# Patient Record
Sex: MALE | Race: WHITE | NOT HISPANIC OR LATINO | Employment: FULL TIME | URBAN - METROPOLITAN AREA
[De-identification: names, ages, dates, MRNs, and addresses within clinical notes are randomized per-mention and may not be internally consistent; named-entity substitution may affect disease eponyms.]

---

## 2019-12-20 ENCOUNTER — OFFICE VISIT (OUTPATIENT)
Dept: FAMILY MEDICINE CLINIC | Facility: CLINIC | Age: 29
End: 2019-12-20

## 2019-12-20 ENCOUNTER — TELEPHONE (OUTPATIENT)
Dept: FAMILY MEDICINE CLINIC | Facility: CLINIC | Age: 29
End: 2019-12-20

## 2019-12-20 ENCOUNTER — HOSPITAL ENCOUNTER (OUTPATIENT)
Dept: GENERAL RADIOLOGY | Facility: HOSPITAL | Age: 29
Discharge: HOME | End: 2019-12-20
Admitting: FAMILY MEDICINE

## 2019-12-20 VITALS
WEIGHT: 179 LBS | HEIGHT: 74 IN | RESPIRATION RATE: 18 BRPM | BODY MASS INDEX: 22.97 KG/M2 | DIASTOLIC BLOOD PRESSURE: 82 MMHG | TEMPERATURE: 97.8 F | HEART RATE: 78 BPM | SYSTOLIC BLOOD PRESSURE: 136 MMHG

## 2019-12-20 DIAGNOSIS — S29.9XXA RIB INJURY: Primary | ICD-10-CM

## 2019-12-20 PROCEDURE — 71100 X-RAY EXAM RIBS UNI 2 VIEWS: CPT

## 2019-12-20 PROCEDURE — 99203 OFFICE O/P NEW LOW 30 MIN: CPT | Performed by: FAMILY MEDICINE

## 2019-12-20 NOTE — PROGRESS NOTES
Assessment/Plan       Problems Addressed this Visit     None      Visit Diagnoses     Rib injury    -  Primary    Relevant Orders    XR Ribs 2 View Right (Completed)            Follow up: No follow-ups on file.     DISCUSSION  New patient here for evaluation.  Recent injury to the right chest wall while playing basketball.  Given chronicity of the discomfort over the last 3 to 4 weeks, recommend check x-ray to evaluate for possible rib fracture.  If negative, then may just be inflammation and need to give a little more time and rest.  Further plan once x-ray back.    Recommend follow-up in 2020 for well examination and routine blood work.             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 Blood clot.  3 months ago    Has had depressed mood since father     1 child due in May (girl)     for college  From [employer]     Had labs done 2 months ago  Labs were all ok    + FH Crohn's   + DM FH        Social History     Tobacco Use   Smoking Status Never Smoker          Past Medical History,Medications, Allergies, and social history was reviewed.          Review of Systems  "  Constitutional: Negative.    HENT: Negative.    Respiratory: Negative.    Cardiovascular: Positive for chest pain ( Right lateral chest wall pain).   Gastrointestinal: Negative.    Genitourinary: Negative.    Musculoskeletal: Negative.    Neurological: Negative.    Psychiatric/Behavioral: Negative.  Positive for depressed mood ( Related to father's recent death).       Objective     Vitals:    12/20/19 1444 12/20/19 1530   BP: 150/74 136/82   Pulse: 78    Resp: 18    Temp: 97.8 °F (36.6 °C)    Weight: 81.2 kg (179 lb)    Height: 188 cm (74\")           Physical Exam   Constitutional: Vital signs are normal. He appears well-developed and well-nourished.   HENT:   Head: Normocephalic and atraumatic.   Right Ear: Hearing, tympanic membrane, external ear and ear canal normal.   Left Ear: Hearing, tympanic membrane, external ear and ear canal normal.   Mouth/Throat: Oropharynx is clear and moist.   Eyes: Pupils are equal, round, and reactive to light. Conjunctivae, EOM and lids are normal.   Neck: Normal range of motion. Neck supple. No thyromegaly present.   Cardiovascular: Normal rate, regular rhythm and normal heart sounds. Exam reveals no friction rub.   No murmur heard.  Pulmonary/Chest: Effort normal and breath sounds normal. No respiratory distress. He has no wheezes. He has no rales.   Abdominal: Soft. Normal appearance and bowel sounds are normal. He exhibits no distension and no mass. There is no tenderness. There is no rebound and no guarding.   Musculoskeletal: He exhibits no edema.   Tenderness of the right lateral chest wall over the ribs.  Lower rib area.  No significant bruising seen at this time.  No crepitus.   Neurological: He is alert. He has normal strength.   Skin: Skin is warm and dry.   Psychiatric: He has a normal mood and affect. His speech is normal. Cognition and memory are normal.   Nursing note and vitals reviewed.    X-ray was ordered and result reviewed.  No obvious fracture.        "       Stanley Lowe MD

## 2023-01-01 NOTE — TELEPHONE ENCOUNTER
Please call.  We fortunately have already received the results from the radiologist.  There is no rib fracture seen on the x-ray.  Good news.  Resume normal activities as tolerated but it may take another couple weeks to completely get better.   Parent

## 2023-03-01 ENCOUNTER — OFFICE VISIT (OUTPATIENT)
Dept: FAMILY MEDICINE CLINIC | Facility: CLINIC | Age: 33
End: 2023-03-01
Payer: COMMERCIAL

## 2023-03-01 VITALS
TEMPERATURE: 100 F | HEIGHT: 74 IN | HEART RATE: 90 BPM | WEIGHT: 172 LBS | OXYGEN SATURATION: 96 % | RESPIRATION RATE: 18 BRPM | DIASTOLIC BLOOD PRESSURE: 88 MMHG | SYSTOLIC BLOOD PRESSURE: 134 MMHG | BODY MASS INDEX: 22.07 KG/M2

## 2023-03-01 DIAGNOSIS — S29.011A MUSCLE STRAIN OF CHEST WALL, INITIAL ENCOUNTER: ICD-10-CM

## 2023-03-01 DIAGNOSIS — Z00.00 WELLNESS EXAMINATION: Primary | ICD-10-CM

## 2023-03-01 DIAGNOSIS — F41.1 GENERALIZED ANXIETY DISORDER: ICD-10-CM

## 2023-03-01 PROCEDURE — 99385 PREV VISIT NEW AGE 18-39: CPT

## 2023-03-01 RX ORDER — ESCITALOPRAM OXALATE 10 MG/1
10 TABLET ORAL DAILY
Qty: 90 TABLET | Refills: 0 | Status: SHIPPED | OUTPATIENT
Start: 2023-03-01

## 2023-03-01 NOTE — PATIENT INSTRUCTIONS
Recommend updating tdap vaccine at pharmacy at patient convenience  Medication as prescribed - notify of any adverse reaction or side effects  Follow up in 4-6 weeks, sooner if needed

## 2023-03-01 NOTE — PROGRESS NOTES
Chief Complaint   Patient presents with   • Establish Care     Re establish - wants to be proactive with health.  Upper left abdominal pressure- discomfort  less than 2 months feels swollen on the inside.  Mother has crohns disease , he has concerns,  Need for Bathroom  is really quick for bowel movements when he has to go.       Subjective      Jaun Neri is a 33 y.o. who presents to re-establish care, have a physical and discuss abdominal pain.  He works out 4-5 days weekly.  Works a stressful job - an . Commutes to [place], [place].  Has two kids at home ages 2.5 and 6 months.     Left upper quadrant abdominal pain - started as discomfort to left upper quadrant 6-7 weeks ago.  Since that time, it has improved.  No trauma. He does lift on his daughter and works out regularly. Voices concern that he could have an issue with his pancreas because the pain lasted for awhile.  It is not painful today.     GI Upset - Family history of Crohn's.  States he has always had a sensitive stomach.  Had appendectomy about 8 years ago.  States he is sensitive to dairy; cramping and then has diarrhea.  If he avoids dairy and other dietary triggers, he is able to avoid GI upset for the most part.  Since appendectomy he has also had some difficulty tolerating abdominal workouts - has muscular cramping.     Anxiety - Had panic attack when his father passed away three years ago.  States that he has never been medicated for anxiety. He is interested in medication.  Admits he does feel down sometimes.  Does not have any difficulty with sleep. Denies SI/HI. Feels that he has always dealt with anxiety, but feels it is time to discuss medication.     The following portions of the patient's history were reviewed and updated as appropriate: allergies, current medications, past family history, past medical history, past social history, past surgical history and problem list.    Review of Systems   Constitutional: Negative for  "activity change, appetite change, chills, diaphoresis and fatigue.   HENT: Negative.  Negative for congestion, ear discharge, ear pain, facial swelling, rhinorrhea, sore throat and tinnitus.    Eyes: Negative.    Respiratory: Negative for chest tightness and shortness of breath.    Cardiovascular: Negative for chest pain, palpitations and leg swelling.   Gastrointestinal: Positive for indigestion (with certain foods - spicy). Negative for abdominal pain, anal bleeding, blood in stool, constipation, diarrhea, nausea, vomiting and GERD.   Endocrine: Negative.    Genitourinary: Negative for decreased libido, dysuria, frequency, hematuria, nocturia (drinks a lot of water), penile swelling and testicular pain.   Musculoskeletal: Negative.    Neurological: Negative for dizziness, light-headedness and numbness.   Psychiatric/Behavioral: Positive for stress (has stressful job). Negative for decreased concentration, dysphoric mood, self-injury, sleep disturbance, suicidal ideas and depressed mood. The patient is nervous/anxious.        Objective   Vital Signs:  /88   Pulse 90   Temp 100 °F (37.8 °C)   Resp 18   Ht 188 cm (74\")   Wt 78 kg (172 lb)   SpO2 96%   BMI 22.08 kg/m²     BMI is within normal parameters. No other follow-up for BMI required.        Physical Exam  Vitals and nursing note reviewed.   Constitutional:       General: He is not in acute distress.     Appearance: Normal appearance. He is not ill-appearing.   HENT:      Head: Normocephalic and atraumatic.      Right Ear: Tympanic membrane, ear canal and external ear normal.      Left Ear: Tympanic membrane, ear canal and external ear normal.      Nose: Nose normal.      Mouth/Throat:      Mouth: Mucous membranes are moist.      Pharynx: No oropharyngeal exudate or posterior oropharyngeal erythema.   Eyes:      General:         Right eye: No discharge.         Left eye: No discharge.      Extraocular Movements: Extraocular movements intact.      " Pupils: Pupils are equal, round, and reactive to light.   Cardiovascular:      Rate and Rhythm: Normal rate and regular rhythm.      Pulses: Normal pulses.      Heart sounds: Normal heart sounds.   Pulmonary:      Effort: Pulmonary effort is normal. No respiratory distress.      Breath sounds: Normal breath sounds.   Chest:      Chest wall: No swelling, tenderness or crepitus.   Abdominal:      General: Bowel sounds are normal. There is no distension.      Palpations: Abdomen is soft. There is no mass.      Tenderness: There is no abdominal tenderness. There is no guarding.   Musculoskeletal:      Cervical back: No tenderness.   Lymphadenopathy:      Cervical: No cervical adenopathy.      Upper Body:      Right upper body: No supraclavicular adenopathy.      Left upper body: No supraclavicular adenopathy.   Neurological:      General: No focal deficit present.      Mental Status: He is alert and oriented to person, place, and time.      Cranial Nerves: No cranial nerve deficit.   Psychiatric:         Attention and Perception: Attention normal.         Mood and Affect: Affect normal. Mood is anxious. Mood is not depressed.         Speech: Speech normal.         Behavior: Behavior normal.         Thought Content: Thought content normal.          Result Review                  RENO-7 = 11       Assessment and Plan  Diagnoses and all orders for this visit:    1. Wellness examination (Primary)  -     CBC Auto Differential  -     Comprehensive Metabolic Panel  -     Hepatitis C Antibody  -     Lipid Panel    2. Generalized anxiety disorder  -     escitalopram (Lexapro) 10 MG tablet; Take 1 tablet by mouth Daily.  Dispense: 90 tablet; Refill: 0  -     Ambulatory Referral to Behavioral Health    3. Muscle strain of chest wall, initial encounter  Comments:  Resolving    Other orders  -     CBC & Differential            Patient Instructions   Recommend updating tdap vaccine at pharmacy at patient convenience  Medication as  prescribed - notify of any adverse reaction or side effects  Follow up in 4-6 weeks, sooner if needed     Discussed wellness and preventative medicine with patient.  He is going to update his tdap vaccine at the pharmacy.  We will get a lipid panel today and check non-fasting labs.  Discussed Hep C screening and patient is agreeable to having this checked as well.      Discussed medication prescribed, possible side effects and safety.  Encouraged patient to call or follow up with any side effects.  He has never had any issue with SI/HI, but did warn that sometimes medication can cause these thoughts.  Patient verbalized understanding of all.     Patient is also interested in going to therapy for his anxiety - referral generated.     Discussed possibility of abdominal / chest wall strain with patient.  Since his symptoms have almost resolved at this point, it is likely that he had a chest wall strain either from working out or lifting and it just took some time to resolve.  Explained common signs and symptoms of pancreatitis for patient to watch for, but I do not suspect any issues with his pancreas at this time.  We also discussed his GI upset and the possibility that his high level of anxiety could be a contributing factor.  Patient to continue to avoid dietary triggers, and we will see if treating his anxiety will help with those symptoms.      Patient was given opportunity to ask questions and all concerns were addressed prior to the conclusion of today's visit.     Follow Up  Return in about 5 weeks (around 4/5/2023).  Patient was given instructions and counseling regarding his condition or for health maintenance advice. Please see specific information pulled into the AVS if appropriate.

## 2023-03-02 PROBLEM — F41.1 GENERALIZED ANXIETY DISORDER: Status: ACTIVE | Noted: 2023-03-02

## 2023-03-02 LAB
ALBUMIN SERPL-MCNC: 5.2 G/DL (ref 3.5–5.2)
ALBUMIN/GLOB SERPL: 3.1 G/DL
ALP SERPL-CCNC: 57 U/L (ref 39–117)
ALT SERPL-CCNC: 17 U/L (ref 1–41)
AST SERPL-CCNC: 16 U/L (ref 1–40)
BASOPHILS # BLD AUTO: 0.03 10*3/MM3 (ref 0–0.2)
BASOPHILS NFR BLD AUTO: 0.6 % (ref 0–1.5)
BILIRUB SERPL-MCNC: 0.5 MG/DL (ref 0–1.2)
BUN SERPL-MCNC: 17 MG/DL (ref 6–20)
BUN/CREAT SERPL: 16 (ref 7–25)
CALCIUM SERPL-MCNC: 9.9 MG/DL (ref 8.6–10.5)
CHLORIDE SERPL-SCNC: 103 MMOL/L (ref 98–107)
CHOLEST SERPL-MCNC: 202 MG/DL (ref 0–200)
CO2 SERPL-SCNC: 26.3 MMOL/L (ref 22–29)
CREAT SERPL-MCNC: 1.06 MG/DL (ref 0.76–1.27)
EGFRCR SERPLBLD CKD-EPI 2021: 95 ML/MIN/1.73
EOSINOPHIL # BLD AUTO: 0.12 10*3/MM3 (ref 0–0.4)
EOSINOPHIL NFR BLD AUTO: 2.3 % (ref 0.3–6.2)
ERYTHROCYTE [DISTWIDTH] IN BLOOD BY AUTOMATED COUNT: 12.3 % (ref 12.3–15.4)
GLOBULIN SER CALC-MCNC: 1.7 GM/DL
GLUCOSE SERPL-MCNC: 102 MG/DL (ref 65–99)
HCT VFR BLD AUTO: 46.2 % (ref 37.5–51)
HCV IGG SERPL QL IA: NON REACTIVE
HDLC SERPL-MCNC: 50 MG/DL (ref 40–60)
HGB BLD-MCNC: 15.6 G/DL (ref 13–17.7)
IMM GRANULOCYTES # BLD AUTO: 0.02 10*3/MM3 (ref 0–0.05)
IMM GRANULOCYTES NFR BLD AUTO: 0.4 % (ref 0–0.5)
LDLC SERPL CALC-MCNC: 137 MG/DL (ref 0–100)
LYMPHOCYTES # BLD AUTO: 1.39 10*3/MM3 (ref 0.7–3.1)
LYMPHOCYTES NFR BLD AUTO: 26.8 % (ref 19.6–45.3)
MCH RBC QN AUTO: 28.5 PG (ref 26.6–33)
MCHC RBC AUTO-ENTMCNC: 33.8 G/DL (ref 31.5–35.7)
MCV RBC AUTO: 84.5 FL (ref 79–97)
MONOCYTES # BLD AUTO: 0.53 10*3/MM3 (ref 0.1–0.9)
MONOCYTES NFR BLD AUTO: 10.2 % (ref 5–12)
NEUTROPHILS # BLD AUTO: 3.1 10*3/MM3 (ref 1.7–7)
NEUTROPHILS NFR BLD AUTO: 59.7 % (ref 42.7–76)
NRBC BLD AUTO-RTO: 0 /100 WBC (ref 0–0.2)
PLATELET # BLD AUTO: 178 10*3/MM3 (ref 140–450)
POTASSIUM SERPL-SCNC: 4.3 MMOL/L (ref 3.5–5.2)
PROT SERPL-MCNC: 6.9 G/DL (ref 6–8.5)
RBC # BLD AUTO: 5.47 10*6/MM3 (ref 4.14–5.8)
SODIUM SERPL-SCNC: 141 MMOL/L (ref 136–145)
TRIGL SERPL-MCNC: 85 MG/DL (ref 0–150)
VLDLC SERPL CALC-MCNC: 15 MG/DL (ref 5–40)
WBC # BLD AUTO: 5.19 10*3/MM3 (ref 3.4–10.8)

## 2023-04-03 DIAGNOSIS — F41.1 GENERALIZED ANXIETY DISORDER: ICD-10-CM

## 2023-04-03 RX ORDER — ESCITALOPRAM OXALATE 10 MG/1
10 TABLET ORAL DAILY
Qty: 90 TABLET | Refills: 0 | OUTPATIENT
Start: 2023-04-03

## 2023-04-03 NOTE — TELEPHONE ENCOUNTER
"    Caller: Jaun Neri Abdifatah \"ABDIFATAH\"    Relationship: Self    Best call back number: [phone]    Requested Prescriptions:   Requested Prescriptions     Pending Prescriptions Disp Refills   • escitalopram (Lexapro) 10 MG tablet 90 tablet 0     Sig: Take 1 tablet by mouth Daily.        Pharmacy where request should be sent: [facility] [id number] [address] [phone] [facility] [phone]      Last office visit with prescribing clinician: Visit date not found   Last telemedicine visit with prescribing clinician: 4/12/2023   Next office visit with prescribing clinician: Visit date not found     Does the patient have less than a 3 day supply:  [x] Yes  [] No    Would you like a call back once the refill request has been completed: [x] Yes [] No    If the office needs to give you a call back, can they leave a voicemail: [x] Yes [] No    Rosio Haley Rep   04/03/23 15:40 EDT           "

## 2023-04-12 ENCOUNTER — OFFICE VISIT (OUTPATIENT)
Dept: FAMILY MEDICINE CLINIC | Facility: CLINIC | Age: 33
End: 2023-04-12
Payer: COMMERCIAL

## 2023-04-12 VITALS
RESPIRATION RATE: 18 BRPM | SYSTOLIC BLOOD PRESSURE: 112 MMHG | HEIGHT: 74 IN | DIASTOLIC BLOOD PRESSURE: 72 MMHG | HEART RATE: 78 BPM | WEIGHT: 173 LBS | TEMPERATURE: 97.4 F | BODY MASS INDEX: 22.2 KG/M2

## 2023-04-12 DIAGNOSIS — F41.1 GENERALIZED ANXIETY DISORDER: Primary | ICD-10-CM

## 2023-04-12 DIAGNOSIS — R53.83 OTHER FATIGUE: ICD-10-CM

## 2023-04-12 DIAGNOSIS — J30.2 SEASONAL ALLERGIC RHINITIS, UNSPECIFIED TRIGGER: ICD-10-CM

## 2023-04-12 RX ORDER — LORATADINE 10 MG/1
10 TABLET ORAL DAILY
Qty: 90 TABLET | Refills: 1 | Status: SHIPPED | OUTPATIENT
Start: 2023-04-12

## 2023-04-12 RX ORDER — ESCITALOPRAM OXALATE 10 MG/1
10 TABLET ORAL DAILY
Qty: 90 TABLET | Refills: 1 | Status: SHIPPED | OUTPATIENT
Start: 2023-04-12

## 2023-04-12 NOTE — PATIENT INSTRUCTIONS
Continue Lexapro as prescribed  Start loratadine / claritin daily for allergies.  May use OTC nasal spray as well to help with sinus pressure. If no improvement or worsening, sinus pain develops or fever in the next 7 days, call back.   Follow up in 2-3 months, if fatigue continues will consider labs for testosterone check, TSH, B12 and Vit D levels.

## 2023-04-12 NOTE — PROGRESS NOTES
Chief Complaint   Patient presents with   • Anxiety     5 week f/u    • Allergies       Subjective      Jaun Neri is a 33 y.o. who presents for follow up on anxiety and to discuss allergies.  Currently going well with Lexapro.  Takes Lexapro in the mornings and it is going well.  He is not having any side effects of medication.  States he feels like his anxiety is doing better overall.  States he has had several meetings recently, and he is not getting as worked up/stressed about the meetings.  He would like to continue current dose.    Seasonal allergies - the last 4-5 years has had seasonal allergies.  Has 3-4 sinus infections a year.  Takes tylenol sinus severe when they occur, and that usually helps.  3 days ago, started having increased sinus pressure and congestion.  No fever or chills.  Has not tried any over-the-counter antihistamines in the past.  Has been taking Mucinex for the past couple days and it is helping some.    Fatigue -continues to have decreased overall energy.  Admits this is currently his busiest time at work.  He would like to continue to monitor for the next couple of months, and if no improvement consider lab work/further work-up.  Father has history of low testosterone onset early 40s, patient wonders if this could be contributing to his symptoms.    The following portions of the patient's history were reviewed and updated as appropriate: allergies, current medications, past family history, past medical history, past social history, past surgical history and problem list.    Review of Systems   Constitutional: Positive for fatigue. Negative for chills and fever.   HENT: Positive for congestion, rhinorrhea and sinus pressure. Negative for trouble swallowing.    Respiratory: Negative for cough, chest tightness and shortness of breath.    Cardiovascular: Negative for chest pain and palpitations.   Gastrointestinal: Negative.    Neurological: Negative for dizziness and numbness.  "  Psychiatric/Behavioral: Negative for decreased concentration, dysphoric mood, self-injury, sleep disturbance, suicidal ideas and stress. The patient is not nervous/anxious.        Objective   Vital Signs:  /72   Pulse 78   Temp 97.4 °F (36.3 °C)   Resp 18   Ht 188 cm (74\")   Wt 78.5 kg (173 lb)   BMI 22.21 kg/m²     BMI is within normal parameters. No other follow-up for BMI required.        Physical Exam  Vitals and nursing note reviewed.   Constitutional:       Appearance: Normal appearance.   HENT:      Head: Normocephalic.      Right Ear: Tympanic membrane, ear canal and external ear normal.      Left Ear: Tympanic membrane, ear canal and external ear normal.      Nose: Rhinorrhea present.      Right Sinus: No maxillary sinus tenderness or frontal sinus tenderness.      Left Sinus: No maxillary sinus tenderness or frontal sinus tenderness.      Mouth/Throat:      Mouth: Mucous membranes are moist.      Pharynx: Posterior oropharyngeal erythema (mild) present.   Eyes:      General:         Right eye: No discharge.         Left eye: No discharge.      Pupils: Pupils are equal, round, and reactive to light.   Neurological:      Mental Status: He is alert.          Result Review                     Assessment and Plan  Diagnoses and all orders for this visit:    1. Generalized anxiety disorder (Primary)  -     escitalopram (Lexapro) 10 MG tablet; Take 1 tablet by mouth Daily.  Dispense: 90 tablet; Refill: 1    2. Seasonal allergic rhinitis, unspecified trigger  -     loratadine (Claritin) 10 MG tablet; Take 1 tablet by mouth Daily.  Dispense: 90 tablet; Refill: 1    3. Other fatigue            Patient Instructions   1. Continue Lexapro as prescribed  2. Start loratadine / claritin daily for allergies.  May use OTC nasal spray as well to help with sinus pressure. If no improvement or worsening, sinus pain develops or fever in the next 7 days, call back.   3. Follow up in 2-3 months, if fatigue continues " will consider labs for testosterone check, TSH, B12 and Vit D levels.      Will continue Lexapro at current dose.  Follow-up with any problems.  We will start loratadine for allergies daily.  Discussed signs and symptoms of sinusitis/ear infection to report.  Patient to follow-up in 2 to 3 months if fatigue continues, will consider further work-up of fatigue.    Discussed medications prescribed and OTC medications recommended.  Discussed medication safety, possible side effects and how to take or administer medications. Instructed patient to report any adverse reactions, side effects or concerns.     Reviewed physical exam findings and plan with patient who verbalized understanding and agrees with plan of care. Patient was given opportunity to ask questions and all concerns were addressed prior to the conclusion of today's visit.     Follow Up  No follow-ups on file.  Patient was given instructions and counseling regarding his condition or for health maintenance advice. Please see specific information pulled into the AVS if appropriate.

## 2023-07-11 ENCOUNTER — TELEPHONE (OUTPATIENT)
Dept: FAMILY MEDICINE CLINIC | Facility: CLINIC | Age: 33
End: 2023-07-11

## 2023-07-11 NOTE — TELEPHONE ENCOUNTER
"  Caller: Jaun Neri \"REYNOLD\"    Relationship: Self    Best call back number: [phone]     Who is your current provider: DR CHAVARRIA SHOWS AS PCP    Who would you like your new provider to be: CHRIS MOCTEZUMA     What are your reasons for transferring care: HAD ESTABLISHED AS NEW PATIENT WITH CHRIS MOCTEZUMA IN MARCH     Additional notes: WAS A PATIENT OF DR CHAVARRIA IN 2019 BUT STOPPED COMING TO OFFICE. REESTABLISHED AS A NEW PATIENT IN MARCH 2023 WITH CHRIS MOCTEZUMA. WANTS TO HAVE CHRIS MOCTEZUMA LISTED AS PCP, LIKES HER STYLE MUCH BETTER.  HAD ALREADY BEEN SEEING HER FOR APPOINTMENTS FROM MARCH ON.     "

## 2024-04-17 DIAGNOSIS — F41.1 GENERALIZED ANXIETY DISORDER: ICD-10-CM

## 2024-04-17 RX ORDER — ESCITALOPRAM OXALATE 10 MG/1
10 TABLET ORAL DAILY
Qty: 30 TABLET | OUTPATIENT
Start: 2024-04-17

## 2024-04-30 DIAGNOSIS — F41.1 GENERALIZED ANXIETY DISORDER: ICD-10-CM

## 2024-04-30 RX ORDER — ESCITALOPRAM OXALATE 10 MG/1
10 TABLET ORAL DAILY
Qty: 90 TABLET | Refills: 1 | OUTPATIENT
Start: 2024-04-30

## 2024-04-30 NOTE — TELEPHONE ENCOUNTER
"Caller: Jaun Neri Reynold \"REYNOLD\"    Relationship: Self    Best call back number: [phone]     Requested Prescriptions:   Requested Prescriptions     Pending Prescriptions Disp Refills    escitalopram (Lexapro) 10 MG tablet 90 tablet 1     Sig: Take 1 tablet by mouth Daily.        Pharmacy where request should be sent: [facility] [id number] [address] [phone] [facility] [phone]      Last office visit with prescribing clinician: 4/12/2023   Last telemedicine visit with prescribing clinician: Visit date not found   Next office visit with prescribing clinician: Visit date not found     Additional details provided by patient: SCHEDULED WITH ALBERTO HOOKER TO ESTABLISH CARE. TOOK HIS LAST DOSE TODAY    Does the patient have less than a 3 day supply:  [x] Yes  [] No    Would you like a call back once the refill request has been completed: [] Yes [x] No    If the office needs to give you a call back, can they leave a voicemail: [] Yes [x] No    Lenore Hare, PCT   04/30/24 08:28 EDT   "

## 2024-05-09 ENCOUNTER — OFFICE VISIT (OUTPATIENT)
Dept: FAMILY MEDICINE CLINIC | Facility: CLINIC | Age: 34
End: 2024-05-09
Payer: COMMERCIAL

## 2024-05-09 VITALS
TEMPERATURE: 99.1 F | HEART RATE: 81 BPM | BODY MASS INDEX: 22.97 KG/M2 | SYSTOLIC BLOOD PRESSURE: 124 MMHG | DIASTOLIC BLOOD PRESSURE: 62 MMHG | HEIGHT: 74 IN | OXYGEN SATURATION: 99 % | WEIGHT: 179 LBS

## 2024-05-09 DIAGNOSIS — Z13.6 ENCOUNTER FOR LIPID SCREENING FOR CARDIOVASCULAR DISEASE: ICD-10-CM

## 2024-05-09 DIAGNOSIS — Z13.1 SCREENING FOR DIABETES MELLITUS: ICD-10-CM

## 2024-05-09 DIAGNOSIS — E55.9 VITAMIN D DEFICIENCY: ICD-10-CM

## 2024-05-09 DIAGNOSIS — F41.1 GENERALIZED ANXIETY DISORDER: ICD-10-CM

## 2024-05-09 DIAGNOSIS — Z13.220 ENCOUNTER FOR LIPID SCREENING FOR CARDIOVASCULAR DISEASE: ICD-10-CM

## 2024-05-09 DIAGNOSIS — Z23 NEED FOR TETANUS BOOSTER: ICD-10-CM

## 2024-05-09 DIAGNOSIS — Z00.00 ENCOUNTER FOR WELL ADULT EXAM WITHOUT ABNORMAL FINDINGS: Primary | ICD-10-CM

## 2024-05-09 PROCEDURE — 90715 TDAP VACCINE 7 YRS/> IM: CPT | Performed by: PHYSICIAN ASSISTANT

## 2024-05-09 PROCEDURE — 99395 PREV VISIT EST AGE 18-39: CPT | Performed by: PHYSICIAN ASSISTANT

## 2024-05-09 PROCEDURE — 90471 IMMUNIZATION ADMIN: CPT | Performed by: PHYSICIAN ASSISTANT

## 2024-05-09 RX ORDER — BUSPIRONE HYDROCHLORIDE 5 MG/1
5 TABLET ORAL 3 TIMES DAILY PRN
Qty: 40 TABLET | Refills: 0 | Status: SHIPPED | OUTPATIENT
Start: 2024-05-09

## 2024-05-09 RX ORDER — ESCITALOPRAM OXALATE 10 MG/1
10 TABLET ORAL DAILY
Qty: 90 TABLET | Refills: 1 | Status: CANCELLED | OUTPATIENT
Start: 2024-05-09

## 2024-05-20 DIAGNOSIS — F41.1 GENERALIZED ANXIETY DISORDER: ICD-10-CM

## 2024-06-06 ENCOUNTER — OFFICE VISIT (OUTPATIENT)
Dept: FAMILY MEDICINE CLINIC | Facility: CLINIC | Age: 34
End: 2024-06-06
Payer: COMMERCIAL

## 2024-06-06 VITALS
BODY MASS INDEX: 22.72 KG/M2 | HEART RATE: 77 BPM | WEIGHT: 177 LBS | HEIGHT: 74 IN | OXYGEN SATURATION: 97 % | SYSTOLIC BLOOD PRESSURE: 118 MMHG | RESPIRATION RATE: 16 BRPM | DIASTOLIC BLOOD PRESSURE: 74 MMHG | TEMPERATURE: 97.7 F

## 2024-06-06 DIAGNOSIS — F41.1 GENERALIZED ANXIETY DISORDER: ICD-10-CM

## 2024-06-06 PROCEDURE — 99213 OFFICE O/P EST LOW 20 MIN: CPT | Performed by: PHYSICIAN ASSISTANT

## 2024-06-06 RX ORDER — BUSPIRONE HYDROCHLORIDE 5 MG/1
5 TABLET ORAL 3 TIMES DAILY PRN
Qty: 90 TABLET | Refills: 5 | Status: SHIPPED | OUTPATIENT
Start: 2024-06-06

## 2024-06-06 RX ORDER — BUSPIRONE HYDROCHLORIDE 5 MG/1
5 TABLET ORAL 3 TIMES DAILY PRN
Qty: 40 TABLET | Refills: 0 | OUTPATIENT
Start: 2024-06-06

## 2024-06-06 NOTE — PROGRESS NOTES
"Subjective   Jaun Neri is a 34 y.o. male.     History of Present Illness   F/u for anxiety   Notes buspar has been working well   Denies side effects   Taking in the AM before work 5 days a week  Helping with anxiety related to team meetings   Does not feel like he is numb or in a fog anymore   Fasting for blood work ordered last visit today. Has been more fatigued.     The following portions of the patient's history were reviewed and updated as appropriate: allergies, current medications, past family history, past medical history, past social history, past surgical history, and problem list.      Objective   Blood pressure 118/74, pulse 77, temperature 97.7 °F (36.5 °C), resp. rate 16, height 188 cm (74\"), weight 80.3 kg (177 lb), SpO2 97%.   Physical Exam  Constitutional:       Appearance: Normal appearance.   Cardiovascular:      Rate and Rhythm: Normal rate and regular rhythm.   Pulmonary:      Effort: Pulmonary effort is normal.      Breath sounds: Normal breath sounds.   Neurological:      Mental Status: He is alert and oriented to person, place, and time.   Psychiatric:         Mood and Affect: Mood normal.         Behavior: Behavior normal.         Assessment & Plan   Diagnoses and all orders for this visit:    1. Generalized anxiety disorder  -     busPIRone (BUSPAR) 5 MG tablet; Take 1 tablet by mouth 3 (Three) Times a Day As Needed (anxiety).  Dispense: 90 tablet; Refill: 5    Continue with low dose buspar   Additional recommendations for fatigue pending lab review              "

## 2024-06-07 LAB
25(OH)D3+25(OH)D2 SERPL-MCNC: 44 NG/ML (ref 30–100)
ALBUMIN SERPL-MCNC: 4.6 G/DL (ref 3.5–5.2)
ALBUMIN/GLOB SERPL: 2.6 G/DL
ALP SERPL-CCNC: 57 U/L (ref 39–117)
ALT SERPL-CCNC: 25 U/L (ref 1–41)
AST SERPL-CCNC: 24 U/L (ref 1–40)
BASOPHILS # BLD AUTO: 0.02 10*3/MM3 (ref 0–0.2)
BASOPHILS NFR BLD AUTO: 0.4 % (ref 0–1.5)
BILIRUB SERPL-MCNC: 0.6 MG/DL (ref 0–1.2)
BUN SERPL-MCNC: 19 MG/DL (ref 6–20)
BUN/CREAT SERPL: 20.4 (ref 7–25)
CALCIUM SERPL-MCNC: 9.4 MG/DL (ref 8.6–10.5)
CHLORIDE SERPL-SCNC: 102 MMOL/L (ref 98–107)
CHOLEST SERPL-MCNC: 212 MG/DL (ref 0–200)
CO2 SERPL-SCNC: 25.4 MMOL/L (ref 22–29)
CREAT SERPL-MCNC: 0.93 MG/DL (ref 0.76–1.27)
EGFRCR SERPLBLD CKD-EPI 2021: 110.5 ML/MIN/1.73
EOSINOPHIL # BLD AUTO: 0.14 10*3/MM3 (ref 0–0.4)
EOSINOPHIL NFR BLD AUTO: 3.1 % (ref 0.3–6.2)
ERYTHROCYTE [DISTWIDTH] IN BLOOD BY AUTOMATED COUNT: 12.4 % (ref 12.3–15.4)
FOLATE SERPL-MCNC: 17.3 NG/ML (ref 4.78–24.2)
GLOBULIN SER CALC-MCNC: 1.8 GM/DL
GLUCOSE SERPL-MCNC: 89 MG/DL (ref 65–99)
HBA1C MFR BLD: 5.6 % (ref 4.8–5.6)
HCT VFR BLD AUTO: 46 % (ref 37.5–51)
HDLC SERPL-MCNC: 43 MG/DL (ref 40–60)
HGB BLD-MCNC: 14.9 G/DL (ref 13–17.7)
IMM GRANULOCYTES # BLD AUTO: 0.01 10*3/MM3 (ref 0–0.05)
IMM GRANULOCYTES NFR BLD AUTO: 0.2 % (ref 0–0.5)
IRON SATN MFR SERPL: 33 % (ref 20–50)
IRON SERPL-MCNC: 127 MCG/DL (ref 59–158)
LDLC SERPL CALC-MCNC: 158 MG/DL (ref 0–100)
LYMPHOCYTES # BLD AUTO: 1.38 10*3/MM3 (ref 0.7–3.1)
LYMPHOCYTES NFR BLD AUTO: 31 % (ref 19.6–45.3)
MCH RBC QN AUTO: 27.7 PG (ref 26.6–33)
MCHC RBC AUTO-ENTMCNC: 32.4 G/DL (ref 31.5–35.7)
MCV RBC AUTO: 85.5 FL (ref 79–97)
MONOCYTES # BLD AUTO: 0.53 10*3/MM3 (ref 0.1–0.9)
MONOCYTES NFR BLD AUTO: 11.9 % (ref 5–12)
NEUTROPHILS # BLD AUTO: 2.37 10*3/MM3 (ref 1.7–7)
NEUTROPHILS NFR BLD AUTO: 53.4 % (ref 42.7–76)
NRBC BLD AUTO-RTO: 0 /100 WBC (ref 0–0.2)
PLATELET # BLD AUTO: 152 10*3/MM3 (ref 140–450)
POTASSIUM SERPL-SCNC: 4.4 MMOL/L (ref 3.5–5.2)
PROT SERPL-MCNC: 6.4 G/DL (ref 6–8.5)
RBC # BLD AUTO: 5.38 10*6/MM3 (ref 4.14–5.8)
SODIUM SERPL-SCNC: 139 MMOL/L (ref 136–145)
T4 FREE SERPL-MCNC: 1.46 NG/DL (ref 0.92–1.68)
TESTOST SERPL-MCNC: 811 NG/DL (ref 264–916)
TIBC SERPL-MCNC: 386 MCG/DL
TRIGL SERPL-MCNC: 61 MG/DL (ref 0–150)
TSH SERPL DL<=0.005 MIU/L-ACNC: 1.74 UIU/ML (ref 0.27–4.2)
UIBC SERPL-MCNC: 259 MCG/DL (ref 112–346)
VIT B12 SERPL-MCNC: 773 PG/ML (ref 211–946)
VLDLC SERPL CALC-MCNC: 11 MG/DL (ref 5–40)
WBC # BLD AUTO: 4.45 10*3/MM3 (ref 3.4–10.8)

## 2024-06-09 DIAGNOSIS — E78.00 HYPERCHOLESTEROLEMIA: Primary | ICD-10-CM

## 2024-10-17 ENCOUNTER — TELEPHONE (OUTPATIENT)
Dept: FAMILY MEDICINE CLINIC | Facility: CLINIC | Age: 34
End: 2024-10-17
Payer: COMMERCIAL

## 2024-10-17 NOTE — TELEPHONE ENCOUNTER
"Caller: Jaun Neri \"REYNOLD\"    Relationship to patient: Self    Best call back number:      Chief complaint: JAW AND SINUS PAIN FOR 8 DAYS    Type of visit: OFFICE VISIT    Requested date: FRIDAY [id number] MORNINGS     If rescheduling, when is the original appointment: NA     Additional notes:PLEASE CALL TO ADVISE             "

## 2025-05-12 ENCOUNTER — OFFICE VISIT (OUTPATIENT)
Dept: FAMILY MEDICINE CLINIC | Facility: CLINIC | Age: 35
End: 2025-05-12
Payer: COMMERCIAL

## 2025-05-12 VITALS
OXYGEN SATURATION: 99 % | HEIGHT: 74 IN | SYSTOLIC BLOOD PRESSURE: 122 MMHG | TEMPERATURE: 97.3 F | WEIGHT: 180 LBS | DIASTOLIC BLOOD PRESSURE: 78 MMHG | BODY MASS INDEX: 23.1 KG/M2 | HEART RATE: 67 BPM

## 2025-05-12 DIAGNOSIS — M79.10 MYALGIA: ICD-10-CM

## 2025-05-12 DIAGNOSIS — F41.1 GENERALIZED ANXIETY DISORDER: ICD-10-CM

## 2025-05-12 DIAGNOSIS — E78.00 HYPERCHOLESTEROLEMIA: ICD-10-CM

## 2025-05-12 DIAGNOSIS — Z00.00 ENCOUNTER FOR WELL ADULT EXAM WITHOUT ABNORMAL FINDINGS: Primary | ICD-10-CM

## 2025-05-12 DIAGNOSIS — E55.9 VITAMIN D INSUFFICIENCY: ICD-10-CM

## 2025-05-12 PROCEDURE — 99395 PREV VISIT EST AGE 18-39: CPT | Performed by: PHYSICIAN ASSISTANT

## 2025-05-12 RX ORDER — BUSPIRONE HYDROCHLORIDE 10 MG/1
10 TABLET ORAL 3 TIMES DAILY PRN
Qty: 90 TABLET | Refills: 11 | Status: SHIPPED | OUTPATIENT
Start: 2025-05-12

## 2025-05-12 NOTE — PROGRESS NOTES
Subjective   Jaun Neri is a 35 y.o. male.     History of Present Illness   History of Present Illness  The patient is a 35-year-old male who presents for an annual physical, following up for generalized anxiety disorder and high cholesterol. He is not currently on cholesterol-lowering medication.    He reports that his current medication, BuSpar, has been beneficial in managing his anxiety. He does not experience the same foggy sensation that he had with Lexapro. He is considering increasing the dosage of BuSpar, as he believes the current dose may be insufficient. He typically takes the medication once daily, six to seven days a week, usually upon arrival at his office. 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 He is able to perceive when the medication takes effect.    He has a history of elevated cholesterol levels and frequently eats out due to his travel schedule. He maintains a healthy diet but acknowledges that his eating habits have deteriorated recently.    He reports experiencing severe cramping, which he manages with post-workout magnesium supplements. He also experiences frequent muscle spasms, which are localized to the areas he has been exercising. He is physically active, engaging in regular workouts, biking, and running.    He reports a decrease in energy levels and attributes this to his anxiety and depression. His sleep quality is satisfactory. He does not take multivitamins but consumes Liquid IV most mornings.    He reports no issues with digestion. He has a family history of gastrointestinal problems and recalls having more severe symptoms in the past, which have improved with dietary modifications.    He has been experiencing a sensation of fluid in his ears, which he believes may be related to his bathing habits.    FAMILY HISTORY  His father had low testosterone levels in his late 40s.  He has a family history of stomach issues.   Expecting 3rd child soon  "    The following portions of the patient's history were reviewed and updated as appropriate: allergies, current medications, past family history, past medical history, past social history, past surgical history, and problem list.    Review of Systems  As noted per HPI     Objective   Blood pressure 122/78, pulse 67, temperature 97.3 °F (36.3 °C), height 188 cm (74\"), weight 81.6 kg (180 lb), SpO2 99%. Body mass index is 23.11 kg/m².     Physical Exam  Vitals and nursing note reviewed.   Constitutional:       Appearance: Normal appearance. He is well-developed.   HENT:      Head: Normocephalic and atraumatic.      Right Ear: Tympanic membrane, ear canal and external ear normal.      Left Ear: Tympanic membrane, ear canal and external ear normal.      Nose: Nose normal.      Mouth/Throat:      Pharynx: No oropharyngeal exudate.   Eyes:      Conjunctiva/sclera: Conjunctivae normal.   Neck:      Thyroid: No thyromegaly.      Trachea: No tracheal deviation.   Cardiovascular:      Rate and Rhythm: Normal rate and regular rhythm.      Heart sounds: Normal heart sounds.   Pulmonary:      Effort: Pulmonary effort is normal. No respiratory distress.      Breath sounds: Normal breath sounds. No wheezing or rales.   Chest:      Chest wall: No tenderness.   Abdominal:      General: Bowel sounds are normal. There is no distension.      Palpations: Abdomen is soft. There is no mass.      Tenderness: There is no abdominal tenderness. There is no guarding or rebound.      Hernia: No hernia is present.   Musculoskeletal:      Cervical back: Normal range of motion and neck supple.   Lymphadenopathy:      Cervical: No cervical adenopathy.   Skin:     General: Skin is warm and dry.   Neurological:      Mental Status: He is alert and oriented to person, place, and time.   Psychiatric:         Mood and Affect: Mood normal.         Behavior: Behavior normal.         Thought Content: Thought content normal.         Judgment: Judgment " "normal.         Results  Labs   - Testosterone levels: Upper levels of normal range   - Vitamin D levels: Normal    Assessment & Plan   Assessment & Plan  1. Generalized anxiety disorder.  - Reports that BuSpar (buspirone) has been helping, but feels the current dose is too low.  - No fogginess experienced with buspirone, unlike previous medication Lexapro.  - Prescription for BuSpar 10 mg provided, with instructions to take half a tablet on less anxious days and a full tablet on high-stress days, such as during meetings. Advised to monitor for potential side effects, including lightheadedness.  - Prescription sent to pharmacy.    2. High cholesterol.  - Informed about the importance of HDL and LDL levels in cholesterol management.  - Apolipoprotein B test ordered to assess risk factors for coronary artery disease.  - Recommended reading \"Outlive: The Science of Longevity\" to understand risk factors and markers, especially given his active lifestyle.  - Nurse will call with lab results and further information regarding risk stratification for cholesterol management.    3. Muscle cramps.  - Experiences frequent muscle spasms and cramping, particularly after workouts.  - Magnesium supplements taken post-workout seem to help.  - Creatine kinase test ordered to screen for rhabdomyolysis.  - Advised to monitor for muscle spasms and cramping.    4. Fatigue.  - Reports low energy levels despite normal testosterone levels in the past.  - Previous testosterone levels were on the upper levels of normal range.  - Testosterone levels will be monitored annually to ensure they remain within the normal range.  - Advised to continue monitoring energy levels and consider multivitamin supplementation.    5. Ear fluid retention.  - Reports sensation of fluid in ears, likely due to frequent baths.  - Over-the-counter Flonase recommended to address fluid retention behind the eardrum.  - Advised to use swimmer's ear drops post-bath to " expedite drying.  - No ear wax present and eardrum intact.    6. Gastrointestinal issues.  - Informed that stress can exacerbate gastrointestinal symptoms such as loose stools and increased acid production.  - Advised to be mindful of diet and stress management to mitigate gastrointestinal symptoms.  - Reports digestion has been good lately but will continue to monitor.     Diagnoses and all orders for this visit:    1. Encounter for well adult exam without abnormal findings (Primary)  -     CBC w AUTO Differential  -     Comprehensive metabolic panel  -     TSH  -     T4, free  -     Vitamin D 25 hydroxy  -     Magnesium  -     Testosterone  -     NMR LipoProfile  -     Apolipoprotein B  -     Iron Profile  -     Vitamin B12  -     Folate  -     CK  -     Sedimentation rate, automated  -     C-reactive protein    2. Hypercholesterolemia  -     CBC w AUTO Differential  -     Comprehensive metabolic panel  -     NMR LipoProfile  -     Apolipoprotein B    3. Generalized anxiety disorder  -     TSH  -     T4, free  -     Magnesium  -     Testosterone  -     busPIRone (BUSPAR) 10 MG tablet; Take 1 tablet by mouth 3 (Three) Times a Day As Needed (anxiety).  Dispense: 90 tablet; Refill: 11  -     Iron Profile  -     Vitamin B12  -     Folate    4. Vitamin D insufficiency  -     Vitamin D 25 hydroxy    5. Myalgia  -     CK  -     Sedimentation rate, automated  -     C-reactive protein      Counseling was given to patient for the following topics: instructions for management, risk factor reductions, patient and family education, and impressions . Total time of the encounter was 20 minutes and 10 minutes was spent counseling.               Patient or patient representative verbalized consent for the use of Ambient Listening during the visit with  SHAKIRA Vela for chart documentation. 5/12/2025  08:06 EDT

## 2025-05-15 ENCOUNTER — RESULTS FOLLOW-UP (OUTPATIENT)
Dept: FAMILY MEDICINE CLINIC | Facility: CLINIC | Age: 35
End: 2025-05-15
Payer: COMMERCIAL

## 2025-05-15 DIAGNOSIS — D69.6 LOW PLATELET COUNT: Primary | ICD-10-CM

## 2025-05-15 DIAGNOSIS — E78.00 HYPERCHOLESTEROLEMIA: Primary | ICD-10-CM

## 2025-05-15 LAB
25(OH)D3+25(OH)D2 SERPL-MCNC: 39.7 NG/ML (ref 30–100)
ALBUMIN SERPL-MCNC: 4.7 G/DL (ref 4.1–5.1)
ALP SERPL-CCNC: 58 IU/L (ref 44–121)
ALT SERPL-CCNC: 16 IU/L (ref 0–44)
APO B SERPL-MCNC: 89 MG/DL
AST SERPL-CCNC: 19 IU/L (ref 0–40)
BASOPHILS # BLD AUTO: 0 X10E3/UL (ref 0–0.2)
BASOPHILS NFR BLD AUTO: 1 %
BILIRUB SERPL-MCNC: 0.7 MG/DL (ref 0–1.2)
BUN SERPL-MCNC: 16 MG/DL (ref 6–20)
BUN/CREAT SERPL: 17 (ref 9–20)
CALCIUM SERPL-MCNC: 9.2 MG/DL (ref 8.7–10.2)
CHLORIDE SERPL-SCNC: 104 MMOL/L (ref 96–106)
CHOLEST SERPL-MCNC: 181 MG/DL (ref 100–199)
CK SERPL-CCNC: 223 U/L (ref 49–439)
CO2 SERPL-SCNC: 23 MMOL/L (ref 20–29)
CREAT SERPL-MCNC: 0.94 MG/DL (ref 0.76–1.27)
CRP SERPL-MCNC: <1 MG/L (ref 0–10)
EGFRCR SERPLBLD CKD-EPI 2021: 108 ML/MIN/1.73
EOSINOPHIL # BLD AUTO: 0.1 X10E3/UL (ref 0–0.4)
EOSINOPHIL NFR BLD AUTO: 2 %
ERYTHROCYTE [DISTWIDTH] IN BLOOD BY AUTOMATED COUNT: 13.2 % (ref 11.6–15.4)
ERYTHROCYTE [SEDIMENTATION RATE] IN BLOOD BY WESTERGREN METHOD: 2 MM/HR (ref 0–15)
FOLATE SERPL-MCNC: >20 NG/ML
GLOBULIN SER CALC-MCNC: 1.4 G/DL (ref 1.5–4.5)
GLUCOSE SERPL-MCNC: 100 MG/DL (ref 70–99)
HCT VFR BLD AUTO: 47.3 % (ref 37.5–51)
HDL SERPL-SCNC: 27.8 UMOL/L
HDLC SERPL-MCNC: 49 MG/DL
HGB BLD-MCNC: 14.8 G/DL (ref 13–17.7)
IMM GRANULOCYTES # BLD AUTO: 0 X10E3/UL (ref 0–0.1)
IMM GRANULOCYTES NFR BLD AUTO: 0 %
IRON SATN MFR SERPL: 44 % (ref 15–55)
IRON SERPL-MCNC: 131 UG/DL (ref 38–169)
LDL SERPL QN: 21.1 NM
LDL SERPL-SCNC: 1345 NMOL/L
LDL SMALL SERPL-SCNC: 387 NMOL/L
LDLC SERPL CALC-MCNC: 122 MG/DL (ref 0–99)
LYMPHOCYTES # BLD AUTO: 1.2 X10E3/UL (ref 0.7–3.1)
LYMPHOCYTES NFR BLD AUTO: 28 %
MAGNESIUM SERPL-MCNC: 2.2 MG/DL (ref 1.6–2.3)
MCH RBC QN AUTO: 28.5 PG (ref 26.6–33)
MCHC RBC AUTO-ENTMCNC: 31.3 G/DL (ref 31.5–35.7)
MCV RBC AUTO: 91 FL (ref 79–97)
MONOCYTES # BLD AUTO: 0.5 X10E3/UL (ref 0.1–0.9)
MONOCYTES NFR BLD AUTO: 12 %
NEUTROPHILS # BLD AUTO: 2.3 X10E3/UL (ref 1.4–7)
NEUTROPHILS NFR BLD AUTO: 57 %
PLATELET # BLD AUTO: 147 X10E3/UL (ref 150–450)
POTASSIUM SERPL-SCNC: 4.2 MMOL/L (ref 3.5–5.2)
PROT SERPL-MCNC: 6.1 G/DL (ref 6–8.5)
RBC # BLD AUTO: 5.19 X10E6/UL (ref 4.14–5.8)
SODIUM SERPL-SCNC: 141 MMOL/L (ref 134–144)
T4 FREE SERPL-MCNC: 1.27 NG/DL (ref 0.82–1.77)
TESTOST SERPL-MCNC: 783 NG/DL (ref 264–916)
TIBC SERPL-MCNC: 298 UG/DL (ref 250–450)
TRIGL SERPL-MCNC: 49 MG/DL (ref 0–149)
TSH SERPL DL<=0.005 MIU/L-ACNC: 1.31 UIU/ML (ref 0.45–4.5)
UIBC SERPL-MCNC: 167 UG/DL (ref 111–343)
VIT B12 SERPL-MCNC: 687 PG/ML (ref 232–1245)
WBC # BLD AUTO: 4.1 X10E3/UL (ref 3.4–10.8)

## 2025-05-19 RX ORDER — ROSUVASTATIN CALCIUM 10 MG/1
10 TABLET, COATED ORAL DAILY
Qty: 90 TABLET | Refills: 1 | Status: SHIPPED | OUTPATIENT
Start: 2025-05-19 | End: 2025-05-19

## 2025-05-19 RX ORDER — EZETIMIBE 10 MG/1
10 TABLET ORAL DAILY
Qty: 90 TABLET | Refills: 1 | Status: SHIPPED | OUTPATIENT
Start: 2025-05-19

## 2025-08-25 ENCOUNTER — TELEPHONE (OUTPATIENT)
Dept: FAMILY MEDICINE CLINIC | Facility: CLINIC | Age: 35
End: 2025-08-25
Payer: COMMERCIAL

## 2025-08-25 ENCOUNTER — TELEMEDICINE (OUTPATIENT)
Dept: FAMILY MEDICINE CLINIC | Facility: TELEHEALTH | Age: 35
End: 2025-08-25
Payer: COMMERCIAL

## 2025-08-25 DIAGNOSIS — U07.1 COVID-19: Primary | ICD-10-CM

## 2025-08-25 PROCEDURE — 99213 OFFICE O/P EST LOW 20 MIN: CPT | Performed by: NURSE PRACTITIONER

## 2025-08-25 RX ORDER — ONDANSETRON 4 MG/1
4 TABLET, ORALLY DISINTEGRATING ORAL EVERY 8 HOURS PRN
Qty: 12 TABLET | Refills: 0 | Status: SHIPPED | OUTPATIENT
Start: 2025-08-25

## 2025-08-25 RX ORDER — BROMPHENIRAMINE MALEATE, PSEUDOEPHEDRINE HYDROCHLORIDE, AND DEXTROMETHORPHAN HYDROBROMIDE 2; 30; 10 MG/5ML; MG/5ML; MG/5ML
5 SYRUP ORAL 4 TIMES DAILY PRN
Qty: 120 ML | Refills: 0 | Status: SHIPPED | OUTPATIENT
Start: 2025-08-25

## 2025-08-25 RX ORDER — ROSUVASTATIN CALCIUM 10 MG/1
10 TABLET, COATED ORAL
COMMUNITY
Start: 2025-05-19